# Patient Record
Sex: MALE | Race: WHITE | NOT HISPANIC OR LATINO | Employment: FULL TIME | URBAN - METROPOLITAN AREA
[De-identification: names, ages, dates, MRNs, and addresses within clinical notes are randomized per-mention and may not be internally consistent; named-entity substitution may affect disease eponyms.]

---

## 2021-10-25 PROCEDURE — 99284 EMERGENCY DEPT VISIT MOD MDM: CPT | Mod: 25

## 2021-10-25 PROCEDURE — 3E033GC INTRODUCTION OF OTHER THERAPEUTIC SUBSTANCE INTO PERIPHERAL VEIN, PERCUTANEOUS APPROACH: ICD-10-PCS | Performed by: EMERGENCY MEDICINE

## 2021-10-25 PROCEDURE — 96375 TX/PRO/DX INJ NEW DRUG ADDON: CPT

## 2021-10-25 PROCEDURE — 96374 THER/PROPH/DIAG INJ IV PUSH: CPT

## 2021-10-25 PROCEDURE — 3E0333Z INTRODUCTION OF ANTI-INFLAMMATORY INTO PERIPHERAL VEIN, PERCUTANEOUS APPROACH: ICD-10-PCS | Performed by: EMERGENCY MEDICINE

## 2021-10-25 RX ORDER — ATORVASTATIN CALCIUM 10 MG/1
10 TABLET, FILM COATED ORAL DAILY
COMMUNITY

## 2021-10-26 ENCOUNTER — APPOINTMENT (EMERGENCY)
Dept: RADIOLOGY | Facility: HOSPITAL | Age: 46
End: 2021-10-26
Attending: EMERGENCY MEDICINE
Payer: COMMERCIAL

## 2021-10-26 ENCOUNTER — HOSPITAL ENCOUNTER (EMERGENCY)
Facility: HOSPITAL | Age: 46
Discharge: HOME | End: 2021-10-26
Attending: EMERGENCY MEDICINE
Payer: COMMERCIAL

## 2021-10-26 ENCOUNTER — TELEPHONE (OUTPATIENT)
Dept: CARDIOLOGY | Facility: CLINIC | Age: 46
End: 2021-10-26

## 2021-10-26 ENCOUNTER — APPOINTMENT (EMERGENCY)
Dept: CARDIOLOGY | Facility: HOSPITAL | Age: 46
End: 2021-10-26
Attending: NURSE PRACTITIONER
Payer: COMMERCIAL

## 2021-10-26 VITALS
BODY MASS INDEX: 29.92 KG/M2 | SYSTOLIC BLOOD PRESSURE: 145 MMHG | RESPIRATION RATE: 16 BRPM | DIASTOLIC BLOOD PRESSURE: 106 MMHG | HEIGHT: 70 IN | OXYGEN SATURATION: 99 % | WEIGHT: 209 LBS | TEMPERATURE: 98.2 F | HEART RATE: 79 BPM

## 2021-10-26 VITALS
SYSTOLIC BLOOD PRESSURE: 134 MMHG | TEMPERATURE: 98.1 F | OXYGEN SATURATION: 99 % | DIASTOLIC BLOOD PRESSURE: 92 MMHG | HEART RATE: 83 BPM | RESPIRATION RATE: 22 BRPM

## 2021-10-26 DIAGNOSIS — R07.9 CHEST PAIN, UNSPECIFIED TYPE: ICD-10-CM

## 2021-10-26 DIAGNOSIS — R07.9 CHEST PAIN, UNSPECIFIED TYPE: Primary | ICD-10-CM

## 2021-10-26 DIAGNOSIS — R07.89 OTHER CHEST PAIN: Primary | ICD-10-CM

## 2021-10-26 DIAGNOSIS — Z82.49 FAMILY HISTORY OF CORONARY ARTERY DISEASE: ICD-10-CM

## 2021-10-26 PROBLEM — Z86.79 HISTORY OF PERICARDITIS: Status: ACTIVE | Noted: 2021-10-26

## 2021-10-26 LAB
ALBUMIN SERPL-MCNC: 5 G/DL (ref 3.4–5)
ALP SERPL-CCNC: 59 IU/L (ref 35–126)
ALT SERPL-CCNC: 31 IU/L (ref 16–63)
ANION GAP SERPL CALC-SCNC: 9 MEQ/L (ref 3–15)
AORTIC ROOT ANNULUS - M-MODE: 2.9 CM
AORTIC VALVE MEAN VELOCITY: 0.97 M/S
AORTIC VALVE VELOCITY TIME INTEGRAL: 29.1 CM
ASCENDING AORTA: 2.8 CM
AST SERPL-CCNC: 28 IU/L (ref 15–41)
ATRIAL RATE: 77
ATRIAL RATE: 95
AV MEAN GRADIENT: 4 MMHG
AV PEAK GRADIENT: 8 MMHG
AV PEAK VELOCITY-S: 1.45 M/S
BASOPHILS # BLD: 0.03 K/UL (ref 0.01–0.1)
BASOPHILS NFR BLD: 0.4 %
BILIRUB SERPL-MCNC: 0.6 MG/DL (ref 0.3–1.2)
BSA FOR ECHO PROCEDURE: 2.16 M2
BUN SERPL-MCNC: 12 MG/DL (ref 8–20)
CALCIUM SERPL-MCNC: 9.3 MG/DL (ref 8.9–10.3)
CHLORIDE SERPL-SCNC: 104 MEQ/L (ref 98–109)
CO2 SERPL-SCNC: 26 MEQ/L (ref 22–32)
CREAT SERPL-MCNC: 1.1 MG/DL (ref 0.8–1.3)
CRP SERPL-MCNC: 7.8 MG/L
D DIMER PPP IA.FEU-MCNC: <0.27 UG/ML FEU (ref 0–0.5)
DIFFERENTIAL METHOD BLD: NORMAL
E WAVE DECELERATION TIME: 174 MS
E/A RATIO: 1.2
E/E' RATIO: 11.4
E/LAT E' RATIO: 7.3
EDV (BP): 65.4 CM3
EF (A4C): 72.6 %
EF A2C: 69.9 %
EJECTION FRACTION: 70.3 %
EOSINOPHIL # BLD: 0.08 K/UL (ref 0.04–0.54)
EOSINOPHIL NFR BLD: 1.2 %
ERYTHROCYTE [DISTWIDTH] IN BLOOD BY AUTOMATED COUNT: 12 % (ref 11.6–14.4)
ERYTHROCYTE [SEDIMENTATION RATE] IN BLOOD BY WESTERGREN METHOD: 9 MM/HR
ESV (BP): 19.4 CM3
FRACTIONAL SHORTENING: 42.7 %
GFR SERPL CREATININE-BSD FRML MDRD: >60 ML/MIN/1.73M*2
GLUCOSE SERPL-MCNC: 101 MG/DL (ref 70–99)
HCT VFR BLDCO AUTO: 45.9 % (ref 40.1–51)
HGB BLD-MCNC: 15.5 G/DL (ref 13.7–17.5)
IMM GRANULOCYTES # BLD AUTO: 0.02 K/UL (ref 0–0.08)
IMM GRANULOCYTES NFR BLD AUTO: 0.3 %
INTERVENTRICULAR SEPTUM: 0.93 CM
LA ESV (BP): 51.6 CM3
LA ESV INDEX (A2C): 23.8 CM3/M2
LA ESV INDEX (BP): 23.89 CM3/M2
LAAS-AP2: 18.6 CM2
LAAS-AP4: 18.4 CM2
LAD 2D: 3.5 CM
LALD A4C: 5.44 CM
LALD A4C: 5.58 CM
LAV-S: 51.4 CM3
LEFT ATRIUM VOLUME INDEX: 23.43 CM3/M2
LEFT ATRIUM VOLUME: 50.6 CM3
LEFT INTERNAL DIMENSION IN SYSTOLE: 2.39 CM (ref 3.37–5.1)
LEFT VENTRICLE DIASTOLIC VOLUME INDEX: 31.81 CM3/M2
LEFT VENTRICLE DIASTOLIC VOLUME: 68.7 CM3
LEFT VENTRICLE SYSTOLIC VOLUME INDEX: 8.75 CM3/M2
LEFT VENTRICLE SYSTOLIC VOLUME: 18.9 CM3
LEFT VENTRICULAR INTERNAL DIMENSION IN DIASTOLE: 4.17 CM (ref 5.76–8)
LEFT VENTRICULAR POSTERIOR WALL IN END DIASTOLE: 0.95 CM (ref 0.72–1.35)
LV DIASTOLIC VOLUME: 62.8 CM3
LV ESV (APICAL 2 CHAMBER): 18.9 CM3
LVAD-AP2: 23.8 CM2
LVAD-AP4: 25.1 CM2
LVAS-AP2: 11.4 CM2
LVAS-AP4: 11 CM2
LVEDVI(A2C): 29.07 CM3/M2
LVEDVI(BP): 30.28 CM3/M2
LVESVI(A2C): 8.75 CM3/M2
LVESVI(BP): 8.98 CM3/M2
LVLD-AP2: 7.49 CM
LVLD-AP4: 7.47 CM
LVLS-AP2: 5.9 CM
LVLS-AP4: 5.55 CM
LVOT MG: 3 MMHG
LVOT MV: 0.84 M/S
LVOT PEAK VELOCITY: 1.46 M/S
LVOT VTI: 24.7 CM
LYMPHOCYTES # BLD: 2.47 K/UL (ref 1.2–3.5)
LYMPHOCYTES NFR BLD: 35.6 %
MCH RBC QN AUTO: 30.6 PG (ref 28–33.2)
MCHC RBC AUTO-ENTMCNC: 33.8 G/DL (ref 32.2–36.5)
MCV RBC AUTO: 90.7 FL (ref 83–98)
MONOCYTES # BLD: 0.51 K/UL (ref 0.3–1)
MONOCYTES NFR BLD: 7.3 %
MV E'TISSUE VEL-LAT: 0.14 M/S
MV E'TISSUE VEL-MED: 0.09 M/S
MV PEAK A VEL: 0.84 M/S
MV PEAK E VEL: 1.02 M/S
NEUTROPHILS # BLD: 3.83 K/UL (ref 1.7–7)
NEUTS SEG NFR BLD: 55.2 %
NRBC BLD-RTO: 0 %
P AXIS: 35
P AXIS: 43
PDW BLD AUTO: 9.7 FL (ref 9.4–12.4)
PLATELET # BLD AUTO: 251 K/UL (ref 150–350)
POSTERIOR WALL: 0.95 CM
POTASSIUM SERPL-SCNC: 3.7 MEQ/L (ref 3.6–5.1)
PR INTERVAL: 160
PR INTERVAL: 162
PROT SERPL-MCNC: 8.4 G/DL (ref 6–8.2)
PULM VEIN S/D RATIO: 2.2
PV PEAK D VEL: 0.42 M/S
PV PEAK S VEL: 0.93 M/S
QRS DURATION: 100
QRS DURATION: 106
QT INTERVAL: 358
QT INTERVAL: 364
QTC CALCULATION(BAZETT): 411
QTC CALCULATION(BAZETT): 449
R AXIS: -3
R AXIS: 8
RBC # BLD AUTO: 5.06 M/UL (ref 4.5–5.8)
SODIUM SERPL-SCNC: 139 MEQ/L (ref 136–144)
T WAVE AXIS: 25
T WAVE AXIS: 38
TROPONIN I SERPL-MCNC: <0.02 NG/ML
TROPONIN I SERPL-MCNC: <0.02 NG/ML
VENTRICULAR RATE: 77
VENTRICULAR RATE: 95
WBC # BLD AUTO: 6.94 K/UL (ref 3.8–10.5)
Z-SCORE OF LEFT VENTRICULAR DIMENSION IN END DIASTOLE: -4.87
Z-SCORE OF LEFT VENTRICULAR DIMENSION IN END SYSTOLE: -4.35
Z-SCORE OF LEFT VENTRICULAR POSTERIOR WALL IN END DIASTOLE: -0.2

## 2021-10-26 PROCEDURE — 93005 ELECTROCARDIOGRAM TRACING: CPT | Performed by: EMERGENCY MEDICINE

## 2021-10-26 PROCEDURE — 93306 TTE W/DOPPLER COMPLETE: CPT | Mod: 26 | Performed by: INTERNAL MEDICINE

## 2021-10-26 PROCEDURE — 71046 X-RAY EXAM CHEST 2 VIEWS: CPT

## 2021-10-26 PROCEDURE — 86140 C-REACTIVE PROTEIN: CPT | Performed by: NURSE PRACTITIONER

## 2021-10-26 PROCEDURE — 36415 COLL VENOUS BLD VENIPUNCTURE: CPT | Performed by: EMERGENCY MEDICINE

## 2021-10-26 PROCEDURE — 71045 X-RAY EXAM CHEST 1 VIEW: CPT

## 2021-10-26 PROCEDURE — 80053 COMPREHEN METABOLIC PANEL: CPT | Performed by: EMERGENCY MEDICINE

## 2021-10-26 PROCEDURE — 99284 EMERGENCY DEPT VISIT MOD MDM: CPT | Performed by: INTERNAL MEDICINE

## 2021-10-26 PROCEDURE — 85652 RBC SED RATE AUTOMATED: CPT | Performed by: NURSE PRACTITIONER

## 2021-10-26 PROCEDURE — 84484 ASSAY OF TROPONIN QUANT: CPT | Mod: 91 | Performed by: EMERGENCY MEDICINE

## 2021-10-26 PROCEDURE — 25000000 HC PHARMACY GENERAL: Performed by: EMERGENCY MEDICINE

## 2021-10-26 PROCEDURE — 99284 EMERGENCY DEPT VISIT MOD MDM: CPT | Mod: 25

## 2021-10-26 PROCEDURE — 63600000 HC DRUGS/DETAIL CODE: Performed by: EMERGENCY MEDICINE

## 2021-10-26 PROCEDURE — 85379 FIBRIN DEGRADATION QUANT: CPT | Performed by: EMERGENCY MEDICINE

## 2021-10-26 PROCEDURE — 93306 TTE W/DOPPLER COMPLETE: CPT

## 2021-10-26 PROCEDURE — 85025 COMPLETE CBC W/AUTO DIFF WBC: CPT | Performed by: EMERGENCY MEDICINE

## 2021-10-26 RX ORDER — KETOROLAC TROMETHAMINE 30 MG/ML
30 INJECTION, SOLUTION INTRAMUSCULAR; INTRAVENOUS ONCE
Status: COMPLETED | OUTPATIENT
Start: 2021-10-26 | End: 2021-10-26

## 2021-10-26 RX ORDER — FAMOTIDINE 10 MG/ML
20 INJECTION INTRAVENOUS ONCE
Status: COMPLETED | OUTPATIENT
Start: 2021-10-26 | End: 2021-10-26

## 2021-10-26 RX ADMIN — FAMOTIDINE 20 MG: 10 INJECTION INTRAVENOUS at 04:22

## 2021-10-26 RX ADMIN — KETOROLAC TROMETHAMINE 30 MG: 30 INJECTION, SOLUTION INTRAMUSCULAR at 04:22

## 2021-10-26 ASSESSMENT — ENCOUNTER SYMPTOMS
WEAKNESS: 0
NAUSEA: 0
SYNCOPE: 0
BRUISES/BLEEDS EASILY: 0
DIAPHORESIS: 0
SEIZURES: 0
ACTIVITY CHANGE: 0
HEMATURIA: 0
SLEEP DISTURBANCES DUE TO BREATHING: 0
SHORTNESS OF BREATH: 0
VOMITING: 0
ABDOMINAL PAIN: 0
PND: 0
ABDOMINAL PAIN: 0
FEVER: 0
COLOR CHANGE: 0
PALPITATIONS: 1
DIFFICULTY URINATING: 0
HEMATEMESIS: 0
DIARRHEA: 0
SORE THROAT: 0
MUSCLE CRAMPS: 0
SPEECH DIFFICULTY: 0
NAUSEA: 0
WHEEZING: 0
DECREASED APPETITE: 0
INSOMNIA: 1
HEMATOCHEZIA: 0
SHORTNESS OF BREATH: 0
FOCAL WEAKNESS: 0
WEAKNESS: 0
COUGH: 0
HEMATURIA: 0
DIFFICULTY URINATING: 0
HEADACHES: 0
FACIAL SWELLING: 0
VERTIGO: 0
DYSPNEA ON EXERTION: 1
FEVER: 0
BACK PAIN: 1
CHILLS: 0
VOMITING: 0
ORTHOPNEA: 0
NERVOUS/ANXIOUS: 1
LIGHT-HEADEDNESS: 1
DIZZINESS: 0
NECK PAIN: 0
HEADACHES: 0
BACK PAIN: 0
COUGH: 0
BACK PAIN: 0
DIZZINESS: 1
NEAR-SYNCOPE: 0
DIAPHORESIS: 0
CHEST TIGHTNESS: 1
AGITATION: 0
HEMOPTYSIS: 0

## 2021-10-26 ASSESSMENT — HEART SCORE
RISK FACTORS: 1-2 RISK FACTORS
TROPONIN: LESS THAN OR EQUAL TO NORMAL LIMIT
HEART SCORE: 2
HISTORY: SLIGHTLY SUSPICIOUS
AGE: 45-64
ECG: NORMAL

## 2021-10-26 NOTE — DISCHARGE INSTRUCTIONS
Return to the ED for any increased chest pain, trouble breathing, nausea, sweating, vomiting, cough, fevers, weakness or numbness, or any worsening of symptoms. Follow up with your healthcare provider for re-evaluation.

## 2021-10-26 NOTE — ASSESSMENT & PLAN NOTE
- No friction rub heard on exam    - Echocardiogram  - Add on inflammatory markers  - Follow-up with cardiologist as previously scheduled

## 2021-10-26 NOTE — ED PROVIDER NOTES
Emergency Medicine Note  HPI   HISTORY OF PRESENT ILLNESS     Pt presents back to ED on receiving a call abut his CXR done at 4am today showing enlarged cardiac silhouette. Pt states he has had chest pain on/off for 7 yrs since he had pericarditis/pericardial effusion. Pt states it has been more persistent over the past month. Pt did have blood work today which consisted of a normal d-dimer, two normal troponin and a normal CMP. Pt denies any fever, cough, sob, abd pain, nausea, vomiting, diarrhea, leg pain or swelling.            Patient History   PAST HISTORY     Reviewed from Nursing Triage: Problems       Past Medical History:   Diagnosis Date   • Lipid disorder        Past Surgical History:   Procedure Laterality Date   • APPENDECTOMY     • CARDIAC CATHETERIZATION         No family history on file.    Social History     Tobacco Use   • Smoking status: Never Smoker   • Smokeless tobacco: Never Used   Substance Use Topics   • Alcohol use: Yes     Comment: rare   • Drug use: Never         Review of Systems   REVIEW OF SYSTEMS     Review of Systems   Constitutional: Negative for fever.   HENT: Negative for congestion.    Eyes: Negative for visual disturbance.   Respiratory: Negative for cough and shortness of breath.    Cardiovascular: Positive for chest pain.   Gastrointestinal: Negative for abdominal pain, nausea and vomiting.   Genitourinary: Negative for difficulty urinating.   Musculoskeletal: Negative for back pain.   Skin: Negative for rash.   Neurological: Negative for headaches.         VITALS     ED Vitals    Date/Time Temp Pulse Resp BP SpO2 [facility]   10/26/21 1445 -- -- -- 143/91 -- DC   10/26/21 1353 36.8 °C (98.2 °F) -- 14 143/91 97 % GS   10/26/21 0954 36.9 °C (98.4 °F) 83 16 141/92 98 % GS        Pulse Ox %: 98 % (10/26/21 1038)  Pulse Ox Interpretation: Normal (10/26/21 1038)  Heart Rate: 83 (10/26/21 1038)  Rhythm Strip Interpretation: Normal Sinus Rhythm (10/26/21 1038)     Physical Exam   PHYSICAL  EXAM     Physical Exam  Vitals and nursing note reviewed.   Constitutional:       Appearance: He is well-developed.   HENT:      Head: Normocephalic and atraumatic.   Eyes:      Conjunctiva/sclera: Conjunctivae normal.   Cardiovascular:      Rate and Rhythm: Normal rate and regular rhythm.      Heart sounds: No murmur heard.      Pulmonary:      Effort: Pulmonary effort is normal. No respiratory distress.      Breath sounds: Normal breath sounds.   Abdominal:      Palpations: Abdomen is soft.      Tenderness: There is no abdominal tenderness.   Musculoskeletal:         General: No swelling or tenderness. Normal range of motion.      Cervical back: Neck supple.   Skin:     General: Skin is warm and dry.   Neurological:      General: No focal deficit present.      Mental Status: He is alert.           PROCEDURES     Procedures     DATA     Results     None          Imaging Results          X-RAY CHEST 2 VIEWS (Final result)  Result time 10/26/21 11:41:49    Final result                 Impression:    IMPRESSION:  No radiographic evidence of acute cardiopulmonary disease.               Narrative:      CLINICAL HISTORY: Enlarged cardiac silhouette on the previous examination.    COMMENT:  PA and lateral views of the chest were obtained.    Comparison: Examination performed 6 hours earlier.    There is no focal consolidation or pleural effusion identified. The cardiac  silhouette is normal in size. Epicardial fat pads are present which likely  contributed to the appearance of an enlarged cardiac silhouette on the prior  chest x-ray.  No acute abnormality is identified in the regional osseous  structures.                                ECG 12 lead   Final Result          Scoring tools            HEART Score: 2                      ED Course & MDM   MDM / ED COURSE and CLINICAL IMPRESSIONS     [facility]    ED Course as of 10/26/21 1527   Tue Oct 26, 2021   1037 EKG: NSR @ 77 bpm, nml pr interval, nml axis, nml qrs interval, nml  st-t waves [EW]   1525 Echo unremarkable. Pt stable for discharge home and outpt cardiology f/u for outpt ct coronary [EW]      ED Course User Index  [EW] Alex Linn MD         Clinical Impressions as of 10/26/21 1527   Chest pain, unspecified type            Alex Linn MD  10/26/21 1527

## 2021-10-26 NOTE — CONSULTS
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 Was discharged home following 2 negative troponin's and normal EKG. Advised to return following a CXR read showing \"Mild prominence of the cardiac silhouette\".     Cardiology consulted for chest pain and abnormal CXR     Assessment/Plan   Other chest pain  Assessment & Plan  - Troponin negative x2   - EKGs have remained non ischemic   - Constant chest pain for 7 years since pericarditis. Relieved with Advil. Reportedly underwent left heart catheterization in 2014 which revealed patent epicardial coronary arteries     - Echocardiogram  - Add on inflammatory markers  - If echocardiogram is without gross abnormalities, CV status stable for discharge from ER. Will arrange for outpatient coronary CTA to be completed next week. The patient is agreeable. Ambulatory orders placed. Our office is aware and will call patient to coordinate testing     History of pericarditis  Overview  - History of in 2014 in setting of respiratory infection   - Reportedly underwent normal left heart catheterization at that time     Assessment & Plan  - No friction rub heard on exam    - Echocardiogram  - Add on inflammatory markers  - Follow-up with cardiologist as previously scheduled     Family history of coronary artery disease  Assessment & Plan  - Father with history of CABG    - Echocardiogram  - For outpatient coronary CTA as outlined.   - Medical management of CAD risk factors (daily statin)          Subjective  Chest pain   HPI: D/w Dr. Linn. Patient seen and examined. Reports chronic chest pain for the last 7 years following a diagnosis of pericarditis. This is described as mid sternal chest pressure, which is relieved with Advil. For the last month, he has been experiencing fleeting left chest wall sharp pains, which he described as \"jabs\". He " was getting out of the shower last night when he had an episode of the sharp pain, which radiated to his left arm and caused tingling. He felt lightheadedness without pre syncope. He has had no recent infectious symptoms. This prompted his ER presentation    He lives 3 hours away, however is staying in the area while he is working. 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 He is staying in the area until next week. He works an intermittently physically intensive job. He denies worsening chest pain with activity. He has noticed worsening fatigue and GALLO since he was diagnosed with Covid last Jose Luis, which he attributes to his lifestyle. No recent orthopnea/PND. He occasionally catherine palpations at night. He reports his father hs a h/o CABG, and his grandfather passed away from an MI. He PCP recently prescribed him a statin.    Review of Systems   Constitutional: Positive for malaise/fatigue. Negative for chills, decreased appetite, diaphoresis and fever.   HENT: Negative for nosebleeds.    Cardiovascular: Positive for chest pain, dyspnea on exertion and palpitations. Negative for leg swelling, near-syncope, orthopnea, paroxysmal nocturnal dyspnea and syncope.   Respiratory: Negative for cough, hemoptysis and sleep disturbances due to breathing.    Hematologic/Lymphatic: Does not bruise/bleed easily.   Skin: Positive for dry skin.   Musculoskeletal: Positive for arthritis, back pain and joint pain. Negative for muscle cramps.   Gastrointestinal: Negative for abdominal pain, hematemesis, hematochezia, melena, nausea and vomiting.   Genitourinary: Negative for hematuria.   Neurological: Positive for light-headedness. Negative for dizziness, focal weakness, headaches, vertigo and weakness.   Psychiatric/Behavioral: The patient has insomnia and is nervous/anxious.    All other systems reviewed and are negative.     Histories:    Medical History:   Past Medical History:   Diagnosis Date    • Lipid disorder        Surgical History:   Past Surgical History:   Procedure Laterality Date   • APPENDECTOMY     • CARDIAC CATHETERIZATION         Social History:    Social History     Tobacco Use   Smoking Status Never Smoker   Smokeless Tobacco Never Used     Social History     Social History Narrative   • Not on file       Family History:   No family history on file.     Allergies:   Patient has no known allergies.   Current Medications:    Scheduled:      Infusions:  No current facility-administered medications for this encounter.       PRN:       Objective   Vital signs in last 24 hours:  Temp:  [36.7 °C (98.1 °F)-36.9 °C (98.4 °F)] 36.9 °C (98.4 °F)  Heart Rate:  [76-84] 83  Resp:  [16-22] 16  BP: (118-158)/() 141/92    Wt Readings from Last 1 Encounters:   10/26/21 94.8 kg (209 lb)       Physical Exam  Vitals and nursing note reviewed.   Constitutional:       General: He is not in acute distress.  HENT:      Head: Normocephalic and atraumatic.      Mouth/Throat:      Mouth: Mucous membranes are dry.   Eyes:      General: No scleral icterus.     Extraocular Movements: Extraocular movements intact.   Neck:      Vascular: No carotid bruit.   Cardiovascular:      Rate and Rhythm: Normal rate and regular rhythm.      Pulses:           Dorsalis pedis pulses are 2+ on the right side and 2+ on the left side.      Heart sounds: No murmur heard.  No friction rub.   Pulmonary:      Effort: Pulmonary effort is normal. No respiratory distress.      Breath sounds: Normal breath sounds. No wheezing, rhonchi or rales.   Abdominal:      General: Bowel sounds are normal. There is no distension.      Palpations: Abdomen is soft.      Tenderness: There is no abdominal tenderness.   Musculoskeletal:      Cervical back: Neck supple.      Right lower leg: No edema.      Left lower leg: No edema.   Skin:     General: Skin is warm and dry.   Neurological:      Mental Status: He is alert and oriented to person, place, and  time. Mental status is at baseline.   Psychiatric:         Mood and Affect: Mood normal.          Labs and Data:     Hematology  Lab Results   Component Value Date    WBC 6.94 10/26/2021    HGB 15.5 10/26/2021    HCT 45.9 10/26/2021     10/26/2021       Chemistries  Lab Results   Component Value Date     10/26/2021    K 3.7 10/26/2021     10/26/2021    CREATININE 1.1 10/26/2021    BUN 12 10/26/2021    CO2 26 10/26/2021    GLUCOSE 101 (H) 10/26/2021    CALCIUM 9.3 10/26/2021    ALT 31 10/26/2021    AST 28 10/26/2021       Biomarkers  Lab Results   Component Value Date    TROPONINI <0.02 10/26/2021    TROPONINI <0.02 10/26/2021      Radiology:  I have independently reviewed the pertinent imaging from the last 24 hrs.    X-RAY CHEST 2 VIEWS    Result Date: 10/26/2021  IMPRESSION: No radiographic evidence of acute cardiopulmonary disease.     X-RAY CHEST 1 VIEW    Result Date: 10/26/2021  IMPRESSION: Mild prominence of the cardiac silhouette.       Cardiology:     ECG   sinus rhythm           MUNIR Jenkins  10/26/2021

## 2021-10-26 NOTE — ASSESSMENT & PLAN NOTE
- Troponin negative x2   - EKGs have remained non ischemic   - Constant chest pain for 7 years since pericarditis. Relieved with Advil. Reportedly underwent left heart catheterization in 2014 which revealed patent epicardial coronary arteries     - Echocardiogram  - Add on inflammatory markers  - If echocardiogram is without gross abnormalities, CV status stable for discharge from ER. Will arrange for outpatient coronary CTA to be completed next week. The patient is agreeable. Ambulatory orders placed. Our office is aware and will call patient to coordinate testing

## 2021-10-26 NOTE — TELEPHONE ENCOUNTER
Patient needs to have a CT Coronary artery with iv contrast at [facility].  NPI# [id number].  Please send back once complete.  Thanks.

## 2021-10-26 NOTE — ASSESSMENT & PLAN NOTE
- Father with history of CABG    - Echocardiogram  - For outpatient coronary CTA as outlined.   - Medical management of CAD risk factors (daily statin)

## 2021-11-05 NOTE — TELEPHONE ENCOUNTER
Auth# [id number]  11/02/2021-01/02/2022    Please contact pt to provide authorization number and assist with scheduling appt.

## 2021-11-16 ENCOUNTER — HOSPITAL ENCOUNTER (OUTPATIENT)
Dept: RADIOLOGY | Facility: HOSPITAL | Age: 46
Discharge: HOME | End: 2021-11-16
Attending: NURSE PRACTITIONER
Payer: COMMERCIAL

## 2021-11-16 VITALS
BODY MASS INDEX: 29.92 KG/M2 | HEIGHT: 70 IN | RESPIRATION RATE: 17 BRPM | SYSTOLIC BLOOD PRESSURE: 108 MMHG | OXYGEN SATURATION: 100 % | HEART RATE: 61 BPM | DIASTOLIC BLOOD PRESSURE: 84 MMHG | WEIGHT: 209 LBS

## 2021-11-16 DIAGNOSIS — R07.89 OTHER CHEST PAIN: ICD-10-CM

## 2021-11-16 DIAGNOSIS — Z82.49 FAMILY HISTORY OF CORONARY ARTERY DISEASE: ICD-10-CM

## 2021-11-16 PROCEDURE — 75574 CT ANGIO HRT W/3D IMAGE: CPT | Mod: ME

## 2021-11-16 PROCEDURE — 63600105 HC IODINE BASED CONTRAST: Mod: JW | Performed by: NURSE PRACTITIONER

## 2021-11-16 PROCEDURE — 63700000 HC SELF-ADMINISTRABLE DRUG: Performed by: NURSE PRACTITIONER

## 2021-11-16 RX ORDER — NITROGLYCERIN 0.4 MG/1
0.4 TABLET SUBLINGUAL ONCE
Status: COMPLETED | OUTPATIENT
Start: 2021-11-16 | End: 2021-11-16

## 2021-11-16 RX ORDER — METOPROLOL TARTRATE 1 MG/ML
5 INJECTION, SOLUTION INTRAVENOUS AS NEEDED
Status: DISCONTINUED | OUTPATIENT
Start: 2021-11-16 | End: 2021-11-16

## 2021-11-16 RX ORDER — METOPROLOL TARTRATE 50 MG/1
50 TABLET ORAL EVERY 30 MIN PRN
Status: DISCONTINUED | OUTPATIENT
Start: 2021-11-16 | End: 2021-11-16

## 2021-11-16 RX ORDER — METOPROLOL TARTRATE 50 MG/1
100 TABLET ORAL ONCE
Status: COMPLETED | OUTPATIENT
Start: 2021-11-16 | End: 2021-11-16

## 2021-11-16 RX ORDER — IVABRADINE 7.5 MG/1
15 TABLET, FILM COATED ORAL ONCE
Status: COMPLETED | OUTPATIENT
Start: 2021-11-16 | End: 2021-11-16

## 2021-11-16 RX ADMIN — IVABRADINE 15 MG: 7.5 TABLET, FILM COATED ORAL at 09:24

## 2021-11-16 RX ADMIN — METOPROLOL TARTRATE 100 MG: 50 TABLET, FILM COATED ORAL at 09:24

## 2021-11-16 RX ADMIN — METOPROLOL TARTRATE 50 MG: 50 TABLET ORAL at 09:47

## 2021-11-16 RX ADMIN — NITROGLYCERIN 0.4 MG: 0.4 TABLET SUBLINGUAL at 10:28

## 2021-11-16 RX ADMIN — IOHEXOL 90 ML: 350 INJECTION, SOLUTION INTRAVENOUS at 10:40

## 2021-11-16 NOTE — DISCHARGE INSTRUCTIONS
Aaron Alex   [phone]   11/16/21    Cardiac CTA Patient Discharge Instructions      Thank you for allowing our CT staff to participate in your care today.  We would like to provide you with some easy to follow instructions before you leave.    DRINK PLENTY OF FLUIDS  Now that your scan is complete, you will need to drink plenty of fluids, preferably water.    DO NOT drink any caffeinated beverages the rest of the day (coffee, tea, caffeinated sodas).    DO NOT drink any alcoholic beverages for the next 24 hours.  We ask you to drink these fluids to dilute the x-ray dye that was used for your scan and allow it to flush through your kidneys.  Caffeine and alcohol will not allow this flushing to occur effectively.       MEDICATION CHANGES:  no    If you have any questions about this, please contact your primary care physician.    If you need immediate medical attention, please go to the nearest Emergency Department or dial 911.    By signing this form, I acknowledge these instructions have been explained to me to my satisfaction and all my questions have been answered.  I have received a copy of this form.

## 2021-11-17 ENCOUNTER — TELEPHONE (OUTPATIENT)
Dept: CARDIOLOGY | Facility: CLINIC | Age: 46
End: 2021-11-17

## 2021-11-17 NOTE — RESULT ENCOUNTER NOTE
Can you please let him know that the CT coronary angiogram did not show any evidence of blockages in the arteries of the heart.  He has a lymph node close to the esophagus and he needs to talk to his primary care physician about it.  Can you please ask him to make a appointment with me at some point so we can go over everything.

## 2021-11-17 NOTE — TELEPHONE ENCOUNTER
----- Message from William Ceja MD sent at 11/17/2021  8:35 AM EST -----  Can you please let him know that the CT coronary angiogram did not show any evidence of blockages in the arteries of the heart.  He has a lymph node close to the esophagus and he needs to talk to his primary care physician about it.  Can you please ask him to make a appointment with me at some point so we can go over everything.

## 2022-02-28 ENCOUNTER — SPOT (OUTPATIENT)
Dept: URBAN - NONMETROPOLITAN AREA CLINIC 9 | Facility: CLINIC | Age: 47
Setting detail: DERMATOLOGY
End: 2022-02-28

## 2022-02-28 DIAGNOSIS — L71.9 ROSACEA, UNSPECIFIED: ICD-10-CM

## 2022-02-28 PROCEDURE — 99203 OFFICE O/P NEW LOW 30 MIN: CPT

## 2022-02-28 PROCEDURE — 11102 TANGNTL BX SKIN SINGLE LES: CPT

## 2022-03-11 ENCOUNTER — MART 1 (OUTPATIENT)
Dept: URBAN - METROPOLITAN AREA CLINIC 27 | Facility: CLINIC | Age: 47
Setting detail: DERMATOLOGY
End: 2022-03-11

## 2022-03-11 DIAGNOSIS — L82.1 OTHER SEBORRHEIC KERATOSIS: ICD-10-CM

## 2022-03-11 PROBLEM — C43.62 MALIGNANT MELANOMA OF LEFT UPPER LIMB, INCLUDING SHOULDER: Status: RESOLVED | Noted: 2022-03-11

## 2022-03-11 PROBLEM — C43.62 MALIGNANT MELANOMA OF LEFT UPPER LIMB, INCLUDING SHOULDER: Status: ACTIVE | Noted: 2022-03-11

## 2022-03-11 PROBLEM — D48.5 NEOPLASM OF UNCERTAIN BEHAVIOR OF SKIN: Status: RESOLVED | Noted: 2022-03-11

## 2022-03-11 PROCEDURE — 88342 IMHCHEM/IMCYTCHM 1ST ANTB: CPT

## 2022-03-11 PROCEDURE — 88331 PATH CONSLTJ SURG 1 BLK 1SPC: CPT

## 2022-03-11 PROCEDURE — 12032 INTMD RPR S/A/T/EXT 2.6-7.5: CPT

## 2022-03-11 PROCEDURE — 11102 TANGNTL BX SKIN SINGLE LES: CPT

## 2022-03-11 PROCEDURE — 88332 PATH CONSLTJ SURG EA ADD BLK: CPT

## 2022-03-11 PROCEDURE — 99214 OFFICE O/P EST MOD 30 MIN: CPT

## 2022-04-08 ENCOUNTER — MART 1 (OUTPATIENT)
Dept: URBAN - METROPOLITAN AREA CLINIC 27 | Facility: CLINIC | Age: 47
Setting detail: DERMATOLOGY
End: 2022-04-08

## 2022-04-08 DIAGNOSIS — L82.0 INFLAMED SEBORRHEIC KERATOSIS: ICD-10-CM

## 2022-04-08 PROCEDURE — 88342 IMHCHEM/IMCYTCHM 1ST ANTB: CPT

## 2022-04-08 PROCEDURE — 13101 CMPLX RPR TRUNK 2.6-7.5 CM: CPT

## 2022-04-08 PROCEDURE — 17313 MOHS 1 STAGE T/A/L: CPT

## 2022-06-14 PROBLEM — C43.62 MALIGNANT MELANOMA OF LEFT UPPER LIMB, INCLUDING SHOULDER: Status: RESOLVED | Noted: 2022-06-14

## 2022-09-22 NOTE — ED PROVIDER NOTES
Emergency Medicine Note  HPI   HISTORY OF PRESENT ILLNESS     47 y/o c/o chest pain.      Pt. States last night around 10:30 /  11pm after getting out of shower developed chest - tight, radiated to left shoulder / arm.  No n/v.  No diaphoresis.  No sob.  Lightheaded at the time.     Feels better now.  Does have some cp with exhaling.     These symptoms have been going on for about 6 weeks but also for last few years. Had cardiac cath in 2014 for similar sympotoms (WNL).   Has f/u with cardiology scheduled Nov 4th.        Pmh:  Borderline cholesterol (on statin x 1 month), does not smoke.  FH CAD.             Patient History   PAST HISTORY     Reviewed from Nursing Triage:      Past Medical History:   Diagnosis Date   • Lipid disorder        Past Surgical History:   Procedure Laterality Date   • APPENDECTOMY     • CARDIAC CATHETERIZATION         History reviewed. No pertinent family history.    Social History     Tobacco Use   • Smoking status: Never Smoker   • Smokeless tobacco: Never Used   Substance Use Topics   • Alcohol use: Yes     Comment: rare   • Drug use: Never         Review of Systems   REVIEW OF SYSTEMS     Review of Systems   Constitutional: Negative for activity change, diaphoresis and fever.   HENT: Negative for facial swelling and sore throat.    Eyes: Negative for visual disturbance.   Respiratory: Positive for chest tightness. Negative for cough, shortness of breath and wheezing.    Cardiovascular: Positive for chest pain. Negative for leg swelling.   Gastrointestinal: Negative for abdominal pain, diarrhea, nausea and vomiting.   Genitourinary: Negative for difficulty urinating and hematuria.   Musculoskeletal: Negative for back pain and neck pain.   Skin: Negative for color change and pallor.   Neurological: Positive for dizziness. Negative for seizures, syncope, speech difficulty, weakness and headaches.   Psychiatric/Behavioral: Negative for agitation and behavioral problems.   All other systems  reviewed and are negative.        VITALS     ED Vitals    Date/Time Temp Pulse Resp BP SpO2 [facility]   10/26/21 0609 -- 79 18 118/97 98 % [facility]   10/26/21 0422 -- -- -- 158/103 -- KR   10/26/21 0419 -- 76 18 158/111 98 % SR   10/25/21 2325 36.7 °C (98.1 °F) 84 20 152/102 100 % PKF        Pulse Ox %: 100 % (10/26/21 0354)  Pulse Ox Interpretation: Normal (10/26/21 0354)  Heart Rate: 84 (10/26/21 0354)  Rhythm Strip Interpretation: Normal Sinus Rhythm (10/26/21 0354)     Physical Exam   PHYSICAL EXAM     Physical Exam  Vitals and nursing note reviewed.   Constitutional:       Appearance: He is well-developed.   HENT:      Head: Normocephalic and atraumatic.   Eyes:      Conjunctiva/sclera: Conjunctivae normal.   Cardiovascular:      Rate and Rhythm: Normal rate and regular rhythm.      Pulses:           Radial pulses are 2+ on the right side and 2+ on the left side.   Pulmonary:      Effort: Pulmonary effort is normal.      Breath sounds: Normal breath sounds.   Chest:      Chest wall: No tenderness.   Abdominal:      General: There is no distension.      Palpations: Abdomen is soft. There is no mass.      Tenderness: There is no abdominal tenderness.   Musculoskeletal:         General: No tenderness or deformity. Normal range of motion.      Cervical back: Normal range of motion.   Skin:     General: Skin is warm and dry.   Neurological:      General: No focal deficit present.      Mental Status: He is alert. Mental status is at baseline.   Psychiatric:         Behavior: Behavior normal.           PROCEDURES     Procedures     DATA     Results     Procedure Component Value Units Date/Time    Troponin I [[id number]]  (Normal) Collected: 10/26/21 0628    Specimen: Blood, Venous Updated: 10/26/21 0702     Troponin I <0.02 ng/mL     Troponin [[id number]]  (Normal) Collected: 10/26/21 0414    Specimen: Blood, Venous Updated: 10/26/21 0448     Troponin I <0.02 ng/mL     D-dimer, quantitative [[id number]]  (Normal) Collected:  10/26/21 0414    Specimen: Blood, Venous Updated: 10/26/21 0446     D-Dimer, Quant <0.27 ug/mL FEU      Comment: Specimen quality checked    The D-Dimer assay can be used as an aid in the diagnosis of DVT or PE. The test can not be used by itself to exclude DVT or PE. When used as a diagnostic aid, the cutoff value is the same as the reference range: <0.5 ug/ml FEU.       Comprehensive metabolic panel [[id number]]  (Abnormal) Collected: 10/26/21 0414    Specimen: Blood, Venous Updated: 10/26/21 0440     Sodium 139 mEQ/L      Potassium 3.7 mEQ/L      Comment: Results obtained on plasma. Plasma Potassium values may be up to 0.4 mEQ/L less than serum values. The differences may be greater for patients with high platelet or white cell counts.        Chloride 104 mEQ/L      CO2 26 mEQ/L      BUN 12 mg/dL      Creatinine 1.1 mg/dL      Glucose 101 mg/dL      Calcium 9.3 mg/dL      AST (SGOT) 28 IU/L      ALT (SGPT) 31 IU/L      Alkaline Phosphatase 59 IU/L      Total Protein 8.4 g/dL      Comment: Test performed on plasma which typically contains approximately 0.4 g/dL more protein than serum.        Albumin 5.0 g/dL      Bilirubin, Total 0.6 mg/dL      eGFR >60.0 mL/min/1.73m*2      Anion Gap 9 mEQ/L     CBC and differential [[id number]] Collected: 10/26/21 0414    Specimen: Blood, Venous Updated: 10/26/21 0425     WBC 6.94 K/uL      RBC 5.06 M/uL      Hemoglobin 15.5 g/dL      Hematocrit 45.9 %      MCV 90.7 fL      MCH 30.6 pg      MCHC 33.8 g/dL      RDW 12.0 %      Platelets 251 K/uL      MPV 9.7 fL      Differential Type Auto     nRBC 0.0 %      Immature Granulocytes 0.3 %      Neutrophils 55.2 %      Lymphocytes 35.6 %      Monocytes 7.3 %      Eosinophils 1.2 %      Basophils 0.4 %      Immature Granulocytes, Absolute 0.02 K/uL      Neutrophils, Absolute 3.83 K/uL      Lymphocytes, Absolute 2.47 K/uL      Monocytes, Absolute 0.51 K/uL      Eosinophils, Absolute 0.08 K/uL      Basophils, Absolute 0.03 K/uL            Imaging Results          X-RAY CHEST 1 VIEW (Preliminary result)  Result time 10/26/21 04:27:15    ED Interpretation    NAD                              EKG 12 lead   ED Interpretation   Rhythm: [NSR]  Rate: 95  P waves: [normal interval]  QRS: [normal QRS]  Axis: [normal]  ST Segments: [no obvious ST elevation or ischemia]            Scoring tools            HEART Score: 2                      ED Course & MDM   MDM / ED COURSE and CLINICAL IMPRESSIONS     [facility]    ED Course as of 10/26/21 0718   Tue Oct 26, 2021   0640 Pt. Feeling better, sx. Improved.  [SM]   0716 Pt. Feels better.  Has cardiology f/u arranged.  Inst. To return prn, f/u as scheduled.  [SM]      ED Course User Index  [SM] Dong Gonzales MD         Clinical Impressions as of 10/26/21 0718   Chest pain, unspecified type            Dong Gonzales MD  10/26/21 0717       Dong Gonzales MD  10/26/21 0718     [NL] : no abnormal lymph nodes palpated [FreeTextEntry3] : left cochlear implant [de-identified] : right eyebrow laceration with 3 sutures in place. Wound is approximated, no edema, no s/s infection